# Patient Record
Sex: MALE | Race: WHITE | NOT HISPANIC OR LATINO | Employment: FULL TIME | URBAN - METROPOLITAN AREA
[De-identification: names, ages, dates, MRNs, and addresses within clinical notes are randomized per-mention and may not be internally consistent; named-entity substitution may affect disease eponyms.]

---

## 2019-08-14 ENCOUNTER — THERAPY VISIT (OUTPATIENT)
Dept: PHYSICAL THERAPY | Facility: CLINIC | Age: 54
End: 2019-08-14
Payer: COMMERCIAL

## 2019-08-14 DIAGNOSIS — S83.421D SPRAIN OF LATERAL COLLATERAL LIGAMENT OF RIGHT KNEE, SUBSEQUENT ENCOUNTER: ICD-10-CM

## 2019-08-14 DIAGNOSIS — M25.561 ACUTE PAIN OF RIGHT KNEE: ICD-10-CM

## 2019-08-14 PROBLEM — S83.421A SPRAIN OF LATERAL COLLATERAL LIGAMENT OF RIGHT KNEE: Status: ACTIVE | Noted: 2019-08-14

## 2019-08-14 PROCEDURE — 97010 HOT OR COLD PACKS THERAPY: CPT | Mod: GP | Performed by: PHYSICAL THERAPIST

## 2019-08-14 PROCEDURE — 97110 THERAPEUTIC EXERCISES: CPT | Mod: GP | Performed by: PHYSICAL THERAPIST

## 2019-08-14 PROCEDURE — 97161 PT EVAL LOW COMPLEX 20 MIN: CPT | Mod: GP | Performed by: PHYSICAL THERAPIST

## 2019-08-14 ASSESSMENT — ACTIVITIES OF DAILY LIVING (ADL)
SWELLING: THE SYMPTOM AFFECTS MY ACTIVITY SLIGHTLY
RAW_SCORE: 55
HOW_WOULD_YOU_RATE_THE_CURRENT_FUNCTION_OF_YOUR_KNEE_DURING_YOUR_USUAL_DAILY_ACTIVITIES_ON_A_SCALE_FROM_0_TO_100_WITH_100_BEING_YOUR_LEVEL_OF_KNEE_FUNCTION_PRIOR_TO_YOUR_INJURY_AND_0_BEING_THE_INABILITY_TO_PERFORM_ANY_OF_YOUR_USUAL_DAILY_ACTIVITIES?: 70
KNEE_ACTIVITY_OF_DAILY_LIVING_SCORE: 78.57
GIVING WAY, BUCKLING OR SHIFTING OF KNEE: I HAVE THE SYMPTOM BUT IT DOES NOT AFFECT MY ACTIVITY
STIFFNESS: THE SYMPTOM AFFECTS MY ACTIVITY MODERATELY
GO DOWN STAIRS: ACTIVITY IS NOT DIFFICULT
KNEEL ON THE FRONT OF YOUR KNEE: ACTIVITY IS FAIRLY DIFFICULT
AS_A_RESULT_OF_YOUR_KNEE_INJURY,_HOW_WOULD_YOU_RATE_YOUR_CURRENT_LEVEL_OF_DAILY_ACTIVITY?: ABNORMAL
GO UP STAIRS: ACTIVITY IS NOT DIFFICULT
SIT WITH YOUR KNEE BENT: ACTIVITY IS NOT DIFFICULT
SQUAT: ACTIVITY IS SOMEWHAT DIFFICULT
LIMPING: I DO NOT HAVE THE SYMPTOM
KNEE_ACTIVITY_OF_DAILY_LIVING_SUM: 55
RISE FROM A CHAIR: ACTIVITY IS MINIMALLY DIFFICULT
STAND: ACTIVITY IS NOT DIFFICULT
HOW_WOULD_YOU_RATE_THE_OVERALL_FUNCTION_OF_YOUR_KNEE_DURING_YOUR_USUAL_DAILY_ACTIVITIES?: ABNORMAL
WEAKNESS: THE SYMPTOM AFFECTS MY ACTIVITY SLIGHTLY
PAIN: I HAVE THE SYMPTOM BUT IT DOES NOT AFFECT MY ACTIVITY
WALK: ACTIVITY IS NOT DIFFICULT

## 2019-08-14 NOTE — PROGRESS NOTES
[facility] Initial Evaluation  Subjective:  Jorge Welch is a 54 year old male with a right knee condition.    The condition occurred due to a fall.  The condition occurred at home.  This is a new condition.    Mechanism/History of injury/symptoms:  5/25/19 patient fell while working on his deck and caught his right foot on a floor joist, tearing his LCL. He developed cellulitis in his lower right leg and was hospitalized for 6 days. This has resolved.  The pain is located anterior, lateral, medial and the quality of pain is reported as achy, shooting.  The degree of pain is reported as varying from 0-6/10. The timing of pain/symptoms is intermittent, worse at night. Associated symptoms include: stiffness, weakness, swelling    Symptoms are exacerbated by: squatting, running, kneeling, twisting.  Symptoms are relieved by: rest.  Since onset symptoms are gradually improving.    Special tests for this condition include: x-ray, MRI.  Previous treatments for this condition include: none.    General health as reported by patient is good.  Pertinent medical history includes: overweight, pain at rest/night.  Medical allergies include: none.  Other surgeries include: L ACL-R 14 years ago.  Current medications:  Anti-depressants    Current occupation: .  Patient's home/work tasks include: computer work, prolonged sitting, repetitive tasks.  Patient is currently working in normal job without restrictions.    Potential barriers to physical therapy: none.  Red flags: none.    Previous level of function: able to fully squat and kneel on right knee, no pain sleeping at night  Current functional restrictions:  Pain with partial squat or kneeling on right knee, shooting pains in knee up thigh when sleeping at night.    HPI                    Objective:  KNEE:    PROM:   L  R   Hyperextension 0 5   Extension 0 0   Flexion 130 125     Strength:   L R   HIP     Flex 5/5 5-/5   Ext 5/5 5/5    Abd 5-/5 4+/5   KNEE     Flex 5/5 4+/5   Ext 5/5 5-/5       Special tests:  Special tests deferred as patient has had MRI    Palpation: mild tenderness along right knee medial joint line    Patellar tracking: grossly WNL    Functional: pain with double leg squat to 70-75 degrees, limited to 15-20 degrees single leg squat on right vs 55-60 on left    Gait: wnl        System    Physical Exam    General     ROS    Assessment/Plan:    Patient is a 54 year old male with right side knee complaints.    Patient has the following significant findings with corresponding treatment plan.                Diagnosis 1:  Right knee pain, LCL tear    Pain -  hot/cold therapy, US, splint/taping/bracing/orthotics, self management, education and home program  Decreased ROM/flexibility - manual therapy, therapeutic exercise and home program  Decreased strength - therapeutic exercise, therapeutic activities and home program  Decreased proprioception - neuro re-education, therapeutic activities and home program  Decreased function - therapeutic activities, home program and functional performance testing  Instability -  Therapeutic Activity  Therapeutic Exercise  Neuromuscular Re-education  Splinting/Taping/Bracing/Orthotic  home program    Therapy Evaluation Codes:   1) History comprised of:   Personal factors that impact the plan of care:      None.    Comorbidity factors that impact the plan of care are:      Overweight and Pain at night/rest.     Medications impacting care: Anti-depressant.  2) Examination of Body Systems comprised of:   Body structures and functions that impact the plan of care:      Knee.   Activity limitations that impact the plan of care are:      Jumping, Running, Sports, Squatting/kneeling and Sleeping.  3) Clinical presentation characteristics are:   Stable/Uncomplicated.  4) Decision-Making    Low complexity using standardized patient assessment instrument and/or measureable assessment of functional  outcome.  Cumulative Therapy Evaluation is: Low complexity.    Previous and current functional limitations:  (See Goal Flow Sheet for this information)    Short term and Long term goals: (See Goal Flow Sheet for this information)     Communication ability:  Patient appears to be able to clearly communicate and understand verbal and written communication and follow directions correctly.  Treatment Explanation - The following has been discussed with the patient:   RX ordered/plan of care  Anticipated outcomes  Possible risks and side effects  This patient would benefit from PT intervention to resume normal activities.   Rehab potential is good.    Frequency:  1 X week, once daily  Duration:  for 4 weeks  Discharge Plan:  Achieve all LTG.  Independent in home treatment program.  Reach maximal therapeutic benefit.    Please refer to the daily flowsheet for treatment today, total treatment time and time spent performing 1:1 timed codes.

## 2019-08-14 NOTE — LETTER
[facility]  [address]  [place] [address]  [phone]    2019    Re: Jorge Welch   :   1965  MRN:  [id number]   REFERRING PHYSICIAN:   [facility]    Date of Initial Evaluation:  19  Visits:  Rxs Used: 1  Reason for Referral:     Acute pain of right knee  Sprain of lateral collateral ligament of right knee, subsequent encounter    [facility] Initial Evaluation    Subjective:  Jorge Welch is a 54 year old male with a right knee condition.    The condition occurred due to a fall.  The condition occurred at home.  This is a new condition.  Mechanism/History of injury/symptoms:  19 patient fell while working on his deck and caught his right foot on a floor joist, tearing his LCL. He developed cellulitis in his lower right leg and was hospitalized for 6 days. This has resolved.  The pain is located anterior, lateral, medial and the quality of pain is reported as achy, shooting.  The degree of pain is reported as varying from 0-6/10. The timing of pain/symptoms is intermittent, worse at night. Associated symptoms include: stiffness, weakness, swelling  Symptoms are exacerbated by: squatting, running, kneeling, twisting.  Symptoms are relieved by: rest.  Since onset symptoms are gradually improving.  Special tests for this condition include: x-ray, MRI.  Previous treatments for this condition include: none.  General health as reported by patient is good.  Pertinent medical history includes: overweight, pain at rest/night.  Medical allergies include: none.  Other surgeries include: L ACL-R 14 years ago.  Current medications:  Anti-depressants  Current occupation: .  Patient's home/work tasks include: computer work, prolonged sitting, repetitive tasks.  Patient is currently working in normal job without restrictions.  Potential barriers to physical therapy: none.  Red flags:  none.  Previous level of function: able to fully squat and kneel on right knee, no pain sleeping at night  Current functional restrictions:  Pain with partial squat or kneeling on right knee, shooting pains in knee up thigh when sleeping at night.    Objective:  KNEE:    PROM:   L  R   Hyperextension 0 5   Extension 0 0   Flexion 130 125     Strength:   L R   HIP     Flex 5/5 5-/5   Ext 5/5 5/5   Abd 5-/5 4+/5   KNEE     Flex 5/5 4+/5   Ext 5/5 5-/5     Special tests:  Special tests deferred as patient has had MRI    Palpation: mild tenderness along right knee medial joint line    Patellar tracking: grossly WNL    Functional: pain with double leg squat to 70-75 degrees, limited to 15-20 degrees single leg squat on right vs 55-60 on left    Gait: wnl    Assessment/Plan:    Patient is a 54 year old male with right side knee complaints.    Patient has the following significant findings with corresponding treatment plan.                Diagnosis 1:  Right knee pain, LCL tear    Pain -  hot/cold therapy, US, splint/taping/bracing/orthotics, self management, education and home program  Decreased ROM/flexibility - manual therapy, therapeutic exercise and home program  Decreased strength - therapeutic exercise, therapeutic activities and home program  Decreased proprioception - neuro re-education, therapeutic activities and home program  Decreased function - therapeutic activities, home program and functional performance testing  Instability -  Therapeutic Activity  Therapeutic Exercise  Neuromuscular Re-education  Splinting/Taping/Bracing/Orthotic  home program    Therapy Evaluation Codes:   1) History comprised of:   Personal factors that impact the plan of care:      None.    Comorbidity factors that impact the plan of care are:      Overweight and Pain at night/rest.     Medications impacting care: Anti-depressant.  2) Examination of Body Systems comprised of:   Body structures and functions that impact the plan of  care:      Knee.   Activity limitations that impact the plan of care are:      Jumping, Running, Sports, Squatting/kneeling and Sleeping.  3) Clinical presentation characteristics are:   Stable/Uncomplicated.  4) Decision-Making    Low complexity using standardized patient assessment instrument and/or measureable assessment of functional outcome.    Cumulative Therapy Evaluation is: Low complexity.  Previous and current functional limitations:  (See Goal Flow Sheet for this information)    Short term and Long term goals: (See Goal Flow Sheet for this information)   Communication ability:  Patient appears to be able to clearly communicate and understand verbal and written communication and follow directions correctly.  Treatment Explanation - The following has been discussed with the patient:   RX ordered/plan of care  Anticipated outcomes  Possible risks and side effects  This patient would benefit from PT intervention to resume normal activities.   Rehab potential is good.    Frequency:  1 X week, once daily  Duration:  for 4 weeks  Discharge Plan:  Achieve all LTG.  Independent in home treatment program.  Reach maximal therapeutic benefit.    Thank you for your referral.    INQUIRIES  Therapist: Magdiel Jackson, PT, DPT, SCS  [facility]  [address] [place] [address]  Phone: [phone]  Fax: [phone]

## 2019-08-23 ENCOUNTER — THERAPY VISIT (OUTPATIENT)
Dept: PHYSICAL THERAPY | Facility: CLINIC | Age: 54
End: 2019-08-23
Payer: COMMERCIAL

## 2019-08-23 DIAGNOSIS — S83.421D SPRAIN OF LATERAL COLLATERAL LIGAMENT OF RIGHT KNEE, SUBSEQUENT ENCOUNTER: ICD-10-CM

## 2019-08-23 DIAGNOSIS — M25.561 ACUTE PAIN OF RIGHT KNEE: ICD-10-CM

## 2019-08-23 PROCEDURE — 97112 NEUROMUSCULAR REEDUCATION: CPT | Mod: GP | Performed by: PHYSICAL THERAPIST

## 2019-08-23 PROCEDURE — 97010 HOT OR COLD PACKS THERAPY: CPT | Mod: GP | Performed by: PHYSICAL THERAPIST

## 2019-08-23 PROCEDURE — 97110 THERAPEUTIC EXERCISES: CPT | Mod: GP | Performed by: PHYSICAL THERAPIST

## 2019-08-29 ENCOUNTER — THERAPY VISIT (OUTPATIENT)
Dept: PHYSICAL THERAPY | Facility: CLINIC | Age: 54
End: 2019-08-29
Payer: COMMERCIAL

## 2019-08-29 DIAGNOSIS — M25.561 ACUTE PAIN OF RIGHT KNEE: ICD-10-CM

## 2019-08-29 DIAGNOSIS — S83.421D SPRAIN OF LATERAL COLLATERAL LIGAMENT OF RIGHT KNEE, SUBSEQUENT ENCOUNTER: ICD-10-CM

## 2019-08-29 PROCEDURE — 97112 NEUROMUSCULAR REEDUCATION: CPT | Mod: GP | Performed by: PHYSICAL THERAPIST

## 2019-08-29 PROCEDURE — 97110 THERAPEUTIC EXERCISES: CPT | Mod: GP | Performed by: PHYSICAL THERAPIST

## 2019-08-29 PROCEDURE — 97010 HOT OR COLD PACKS THERAPY: CPT | Mod: GP | Performed by: PHYSICAL THERAPIST

## 2019-08-29 ASSESSMENT — ACTIVITIES OF DAILY LIVING (ADL)
WALK: ACTIVITY IS NOT DIFFICULT
PAIN: I HAVE THE SYMPTOM BUT IT DOES NOT AFFECT MY ACTIVITY
RISE FROM A CHAIR: ACTIVITY IS NOT DIFFICULT
KNEE_ACTIVITY_OF_DAILY_LIVING_SUM: 66
SIT WITH YOUR KNEE BENT: ACTIVITY IS NOT DIFFICULT
HOW_WOULD_YOU_RATE_THE_CURRENT_FUNCTION_OF_YOUR_KNEE_DURING_YOUR_USUAL_DAILY_ACTIVITIES_ON_A_SCALE_FROM_0_TO_100_WITH_100_BEING_YOUR_LEVEL_OF_KNEE_FUNCTION_PRIOR_TO_YOUR_INJURY_AND_0_BEING_THE_INABILITY_TO_PERFORM_ANY_OF_YOUR_USUAL_DAILY_ACTIVITIES?: 98
AS_A_RESULT_OF_YOUR_KNEE_INJURY,_HOW_WOULD_YOU_RATE_YOUR_CURRENT_LEVEL_OF_DAILY_ACTIVITY?: NORMAL
SQUAT: ACTIVITY IS MINIMALLY DIFFICULT
RAW_SCORE: 66
SWELLING: I DO NOT HAVE THE SYMPTOM
HOW_WOULD_YOU_RATE_THE_OVERALL_FUNCTION_OF_YOUR_KNEE_DURING_YOUR_USUAL_DAILY_ACTIVITIES?: NORMAL
WEAKNESS: I DO NOT HAVE THE SYMPTOM
GO UP STAIRS: ACTIVITY IS NOT DIFFICULT
KNEE_ACTIVITY_OF_DAILY_LIVING_SCORE: 94.29
GIVING WAY, BUCKLING OR SHIFTING OF KNEE: I DO NOT HAVE THE SYMPTOM
KNEEL ON THE FRONT OF YOUR KNEE: ACTIVITY IS MINIMALLY DIFFICULT
GO DOWN STAIRS: ACTIVITY IS NOT DIFFICULT
STIFFNESS: I HAVE THE SYMPTOM BUT IT DOES NOT AFFECT MY ACTIVITY
STAND: ACTIVITY IS NOT DIFFICULT
LIMPING: I DO NOT HAVE THE SYMPTOM

## 2019-12-05 PROBLEM — M25.561 ACUTE PAIN OF RIGHT KNEE: Status: RESOLVED | Noted: 2019-08-14 | Resolved: 2019-12-05

## 2019-12-05 PROBLEM — S83.421A SPRAIN OF LATERAL COLLATERAL LIGAMENT OF RIGHT KNEE: Status: RESOLVED | Noted: 2019-08-14 | Resolved: 2019-12-05

## 2019-12-05 NOTE — PROGRESS NOTES
Patient is discharged from PT.  Please refer to SOAP note dated 8/29/19 for last known functional status as well as final objective/subjective values.

## 2021-04-10 ENCOUNTER — HEALTH MAINTENANCE LETTER (OUTPATIENT)
Age: 56
End: 2021-04-10

## 2021-09-19 ENCOUNTER — HEALTH MAINTENANCE LETTER (OUTPATIENT)
Age: 56
End: 2021-09-19

## 2022-05-01 ENCOUNTER — HEALTH MAINTENANCE LETTER (OUTPATIENT)
Age: 57
End: 2022-05-01

## 2022-11-21 ENCOUNTER — HEALTH MAINTENANCE LETTER (OUTPATIENT)
Age: 57
End: 2022-11-21

## 2023-06-02 ENCOUNTER — HEALTH MAINTENANCE LETTER (OUTPATIENT)
Age: 58
End: 2023-06-02

## 2024-08-05 ENCOUNTER — APPOINTMENT (OUTPATIENT)
Dept: RADIOLOGY | Facility: CLINIC | Age: 59
End: 2024-08-05
Payer: COMMERCIAL

## 2024-08-05 ENCOUNTER — APPOINTMENT (OUTPATIENT)
Dept: ULTRASOUND IMAGING | Facility: CLINIC | Age: 59
End: 2024-08-05
Payer: COMMERCIAL

## 2024-08-05 ENCOUNTER — HOSPITAL ENCOUNTER (EMERGENCY)
Facility: CLINIC | Age: 59
Discharge: HOME | End: 2024-08-05
Attending: EMERGENCY MEDICINE | Admitting: EMERGENCY MEDICINE
Payer: COMMERCIAL

## 2024-08-05 VITALS
HEART RATE: 92 BPM | RESPIRATION RATE: 19 BRPM | SYSTOLIC BLOOD PRESSURE: 173 MMHG | WEIGHT: 248 LBS | TEMPERATURE: 98.8 F | HEIGHT: 65 IN | OXYGEN SATURATION: 97 % | BODY MASS INDEX: 41.32 KG/M2 | DIASTOLIC BLOOD PRESSURE: 83 MMHG

## 2024-08-05 DIAGNOSIS — U07.1 COVID-19: ICD-10-CM

## 2024-08-05 PROBLEM — E78.00 HYPERCHOLESTEROLEMIA: Status: ACTIVE | Noted: 2024-08-05

## 2024-08-05 PROBLEM — S83.421A TEAR OF LATERAL COLLATERAL LIGAMENT OF RIGHT KNEE: Status: ACTIVE | Noted: 2019-08-29

## 2024-08-05 PROBLEM — R79.89 LOW TESTOSTERONE: Status: ACTIVE | Noted: 2018-02-14

## 2024-08-05 PROBLEM — M10.9 GOUT: Status: ACTIVE | Noted: 2023-03-21

## 2024-08-05 PROBLEM — M17.11 PRIMARY OSTEOARTHRITIS OF RIGHT KNEE: Status: ACTIVE | Noted: 2019-08-29

## 2024-08-05 LAB
ALBUMIN SERPL BCG-MCNC: 3.8 G/DL (ref 3.5–5.2)
ALP SERPL-CCNC: 70 U/L (ref 40–150)
ALT SERPL W P-5'-P-CCNC: 26 U/L (ref 0–70)
ANION GAP SERPL CALCULATED.3IONS-SCNC: 10 MMOL/L (ref 7–15)
AST SERPL W P-5'-P-CCNC: ABNORMAL U/L
BASOPHILS # BLD AUTO: 0 10E3/UL (ref 0–0.2)
BASOPHILS NFR BLD AUTO: 0 %
BILIRUB SERPL-MCNC: 0.3 MG/DL
BUN SERPL-MCNC: 12.7 MG/DL (ref 8–23)
CALCIUM SERPL-MCNC: 8.6 MG/DL (ref 8.8–10.4)
CHLORIDE SERPL-SCNC: 99 MMOL/L (ref 98–107)
CREAT SERPL-MCNC: 0.84 MG/DL (ref 0.51–1.17)
CRP SERPL-MCNC: 119 MG/L
EGFRCR SERPLBLD CKD-EPI 2021: >90 ML/MIN/1.73M2
EOSINOPHIL # BLD AUTO: 0.1 10E3/UL (ref 0–0.7)
EOSINOPHIL NFR BLD AUTO: 1 %
ERYTHROCYTE [DISTWIDTH] IN BLOOD BY AUTOMATED COUNT: 12.5 % (ref 10–15)
FLUAV RNA SPEC QL NAA+PROBE: NEGATIVE
FLUBV RNA RESP QL NAA+PROBE: NEGATIVE
GLUCOSE SERPL-MCNC: 150 MG/DL (ref 70–99)
HCO3 SERPL-SCNC: 27 MMOL/L (ref 22–29)
HCT VFR BLD AUTO: 37.3 % (ref 35–53)
HGB BLD-MCNC: 12.9 G/DL (ref 11.7–17.7)
IMM GRANULOCYTES # BLD: 0.1 10E3/UL
IMM GRANULOCYTES NFR BLD: 1 %
LACTATE SERPL-SCNC: 1.2 MMOL/L (ref 0.7–2)
LYMPHOCYTES # BLD AUTO: 0.7 10E3/UL (ref 0.8–5.3)
LYMPHOCYTES NFR BLD AUTO: 7 %
MAGNESIUM SERPL-MCNC: 1.9 MG/DL (ref 1.7–2.3)
MCH RBC QN AUTO: 30.6 PG (ref 26.5–33)
MCHC RBC AUTO-ENTMCNC: 34.6 G/DL (ref 31.5–36.5)
MCV RBC AUTO: 88 FL (ref 78–100)
MONOCYTES # BLD AUTO: 0.6 10E3/UL (ref 0–1.3)
MONOCYTES NFR BLD AUTO: 6 %
NEUTROPHILS # BLD AUTO: 8.6 10E3/UL (ref 1.6–8.3)
NEUTROPHILS NFR BLD AUTO: 86 %
NRBC # BLD AUTO: 0 10E3/UL
NRBC BLD AUTO-RTO: 0 /100
PLATELET # BLD AUTO: 205 10E3/UL (ref 150–450)
POTASSIUM SERPL-SCNC: 4.2 MMOL/L (ref 3.4–5.3)
PROCALCITONIN SERPL IA-MCNC: 0.14 NG/ML
PROT SERPL-MCNC: 6.8 G/DL (ref 6.4–8.3)
RBC # BLD AUTO: 4.22 10E6/UL (ref 3.8–5.9)
RSV RNA SPEC NAA+PROBE: NEGATIVE
SARS-COV-2 RNA RESP QL NAA+PROBE: POSITIVE
SODIUM SERPL-SCNC: 136 MMOL/L (ref 135–145)
TSH SERPL DL<=0.005 MIU/L-ACNC: 1.53 UIU/ML (ref 0.3–4.2)
WBC # BLD AUTO: 10 10E3/UL (ref 4–11)

## 2024-08-05 PROCEDURE — 87040 BLOOD CULTURE FOR BACTERIA: CPT | Performed by: EMERGENCY MEDICINE

## 2024-08-05 PROCEDURE — 250N000013 HC RX MED GY IP 250 OP 250 PS 637

## 2024-08-05 PROCEDURE — 36415 COLL VENOUS BLD VENIPUNCTURE: CPT | Performed by: EMERGENCY MEDICINE

## 2024-08-05 PROCEDURE — 83605 ASSAY OF LACTIC ACID: CPT

## 2024-08-05 PROCEDURE — 96361 HYDRATE IV INFUSION ADD-ON: CPT

## 2024-08-05 PROCEDURE — 87637 SARSCOV2&INF A&B&RSV AMP PRB: CPT

## 2024-08-05 PROCEDURE — 36415 COLL VENOUS BLD VENIPUNCTURE: CPT

## 2024-08-05 PROCEDURE — 83735 ASSAY OF MAGNESIUM: CPT | Performed by: EMERGENCY MEDICINE

## 2024-08-05 PROCEDURE — 84145 PROCALCITONIN (PCT): CPT | Performed by: EMERGENCY MEDICINE

## 2024-08-05 PROCEDURE — 84443 ASSAY THYROID STIM HORMONE: CPT | Performed by: EMERGENCY MEDICINE

## 2024-08-05 PROCEDURE — 85025 COMPLETE CBC W/AUTO DIFF WBC: CPT | Performed by: EMERGENCY MEDICINE

## 2024-08-05 PROCEDURE — 71046 X-RAY EXAM CHEST 2 VIEWS: CPT

## 2024-08-05 PROCEDURE — 86140 C-REACTIVE PROTEIN: CPT | Performed by: EMERGENCY MEDICINE

## 2024-08-05 PROCEDURE — 96360 HYDRATION IV INFUSION INIT: CPT | Mod: 59

## 2024-08-05 PROCEDURE — 258N000003 HC RX IP 258 OP 636: Performed by: EMERGENCY MEDICINE

## 2024-08-05 PROCEDURE — 93971 EXTREMITY STUDY: CPT | Mod: RT

## 2024-08-05 PROCEDURE — 82247 BILIRUBIN TOTAL: CPT | Performed by: EMERGENCY MEDICINE

## 2024-08-05 PROCEDURE — 99284 EMERGENCY DEPT VISIT MOD MDM: CPT | Mod: 25

## 2024-08-05 PROCEDURE — 84155 ASSAY OF PROTEIN SERUM: CPT | Performed by: EMERGENCY MEDICINE

## 2024-08-05 RX ORDER — HYDROXYZINE HYDROCHLORIDE 25 MG/1
TABLET, FILM COATED ORAL
COMMUNITY
Start: 2024-07-19

## 2024-08-05 RX ORDER — HYDROMORPHONE HYDROCHLORIDE 1 MG/ML
0.5 INJECTION, SOLUTION INTRAMUSCULAR; INTRAVENOUS; SUBCUTANEOUS ONCE
Status: DISCONTINUED | OUTPATIENT
Start: 2024-08-05 | End: 2024-08-05 | Stop reason: HOSPADM

## 2024-08-05 RX ORDER — ROSUVASTATIN CALCIUM 10 MG/1
10 TABLET, COATED ORAL AT BEDTIME
COMMUNITY

## 2024-08-05 RX ORDER — ACETAMINOPHEN 325 MG/1
650 TABLET ORAL ONCE
Status: COMPLETED | OUTPATIENT
Start: 2024-08-05 | End: 2024-08-05

## 2024-08-05 RX ORDER — ALLOPURINOL 100 MG/1
1 TABLET ORAL
COMMUNITY
Start: 2023-12-17

## 2024-08-05 RX ORDER — SERTRALINE HYDROCHLORIDE 100 MG/1
1 TABLET, FILM COATED ORAL DAILY
COMMUNITY
Start: 2023-11-08

## 2024-08-05 RX ORDER — CELECOXIB 200 MG/1
200 CAPSULE ORAL
COMMUNITY
Start: 2024-06-15

## 2024-08-05 RX ADMIN — ACETAMINOPHEN 650 MG: 325 TABLET ORAL at 17:49

## 2024-08-05 RX ADMIN — SODIUM CHLORIDE 1000 ML: 9 INJECTION, SOLUTION INTRAVENOUS at 17:46

## 2024-08-05 ASSESSMENT — ACTIVITIES OF DAILY LIVING (ADL): ADLS_ACUITY_SCORE: 35

## 2024-08-05 NOTE — ED PROVIDER NOTES
EMERGENCY DEPARTMENT ENCOUNTER      NAME: Jorge Welch  AGE: 59 year old adult  YOB: 1965  MRN: [id number]  EVALUATION DATE & TIME: No admission date for patient encounter.    PCP: Dustin Alaniz        Chief Complaint   Patient presents with    Fever    Shortness of Breath         IMPRESSION  1. COVID-19        PLAN  - close PCP follow up  - discharge to home    ED COURSE & MEDICAL DECISION MAKING    ED Course as of 08/05/24 2006   Mon Aug 05, 2024   1810 CXR independently reviewed & interpreted by me: no acute cardiopulmonary process.       5:41 PM I was consulted by Emilia Mcgowan PA-C on this patient. I reviewed ED presentation history, assessment, management, plan to this point. Please see ED JANE note for details.    Briefly, 59yoM POD #5 s/p right hip replacement (recovering at home) with 2 days of cough & fever. Found to have COVID here today; suspect this driving presentation. No concern for surgical infection. CXR clear with no pneumonia and procal 0.14; doubt concomitant bacterial pneumonia warranting antibiotics. Blood tests unremarkable. Satting well on room air with normal vitals; ok for outpatient management. Declines Paxlovid which is reasonable to me. Patient able to tolerate PO and walk without difficulty or desat. Patient & family comfortable with discharge at this time. Return precautions and need for PCP follow up discussed and understood. No further questions at the time of discharge.    Did complain of mild right calf pain but no exam suggestion of DVT; US negative as well.        This patient involved a high degree of complexity in medical decision making, as significant risks were present and assessed. Recent encounters & results in medical record reviewed by me.    All workup (i.e. any EKG/labs/imaging as per charting below) reviewed and independently interpreted by me. See respective sections for details.    Broad differential considered for this patient, including  but not limited to:  pneumonia, viral UTI, surgical infection, DVT, PE, cellulitis, necrotizing fasciitis, sepsis, UTI    I had a face to face encounter with this patient seen by the Advanced Practice Provider (JANE). I personally made/approved the management plan and take responsibility for the patient management. I personally saw patient and performed a substantive portion of the visit including all aspects of the medical decision making.    MEDICATIONS GIVEN IN THE EMERGENCY DEPARTMENT  Medications   sodium chloride (PF) 0.9% PF flush 3 mL (has no administration in time range)   HYDROmorphone (PF) (DILAUDID) injection 0.5 mg (has no administration in time range)   acetaminophen (TYLENOL) tablet 650 mg (650 mg Oral $Given 8/5/24 1749)   sodium chloride 0.9% BOLUS 1,000 mL (0 mLs Intravenous Stopped 8/5/24 1933)       NEW PRESCRIPTIONS STARTED AT TODAY'S ER VISIT  Discharge Medication List as of 8/5/2024  7:35 PM        CONTINUE these medications which have NOT CHANGED    Details   allopurinol (ZYLOPRIM) 100 MG tablet Take 1 tablet by mouth daily at 2 pm, Historical      celecoxib (CELEBREX) 200 MG capsule 200 mg, Historical      hydrOXYzine HCl (ATARAX) 25 MG tablet TAKE 1 CAPSULE UP TO 4 TIMES PER DAY AS NEEDED FOR PAIN., Historical      sertraline (ZOLOFT) 100 MG tablet Take 1 tablet by mouth daily, Historical      rosuvastatin (CRESTOR) 10 MG tablet Take 10 mg by mouth at bedtime, Historical                 =================================================================      HPI  Use of : N/A         Jorge Welch is a 59 year old adult with a pertinent history of S/P left hip replacement who presents to this ED via walk-in for evaluation of fever and shortness of breath.     Patient reports fevers, chills, and general malaise worsening since last night. Family says he had a fever of 102 last night. Patient endorses some worsening left leg pain and some shortness of breath as well. He had a  left hip replacement 4 days ago, 8/1/24. Patient on Asprin but denies any other blood thinners. Family says he last had oxycodone and hydroxyzine last at 2 PM. Otherwise, patient denies any chest pain, congestion, runny nose, or headache separate from cough. No other complaints at this time.       --------------- MEDICAL HISTORY ---------------  PAST MEDICAL HISTORY:  Reviewed by me.  History reviewed. No pertinent past medical history.  Patient Active Problem List   Diagnosis    Anxiety    Gout    Hypercholesterolemia    Low testosterone    Major depression, chronic    Primary osteoarthritis of right knee    Tear of lateral collateral ligament of right knee       PAST SURGICAL HISTORY:  Reviewed by me.  History reviewed. No pertinent surgical history.    CURRENT MEDICATIONS:    Reviewed by me.    Current Facility-Administered Medications:     HYDROmorphone (PF) (DILAUDID) injection 0.5 mg, 0.5 mg, Intravenous, Once, Emilia Mcgowan PA-C    sodium chloride (PF) 0.9% PF flush 3 mL, 3 mL, Intracatheter, Q8H, Emilia Mcgowan PA-C    Current Outpatient Medications:     allopurinol (ZYLOPRIM) 100 MG tablet, Take 1 tablet by mouth daily at 2 pm, Disp: , Rfl:     celecoxib (CELEBREX) 200 MG capsule, 200 mg, Disp: , Rfl:     hydrOXYzine HCl (ATARAX) 25 MG tablet, TAKE 1 CAPSULE UP TO 4 TIMES PER DAY AS NEEDED FOR PAIN., Disp: , Rfl:     sertraline (ZOLOFT) 100 MG tablet, Take 1 tablet by mouth daily, Disp: , Rfl:     rosuvastatin (CRESTOR) 10 MG tablet, Take 10 mg by mouth at bedtime, Disp: , Rfl:     ALLERGIES:  Reviewed by me.  No Known Allergies    FAMILY HISTORY:  Reviewed by me.  History reviewed. No pertinent family history.    SOCIAL HISTORY:   Reviewed by me.  Social History     Socioeconomic History    Marital status:      Spouse name: None    Number of children: None    Years of education: None    Highest education level: None     Social Determinants of Health     Financial Resource Strain: Low  "Risk  (7/10/2024)    Received from [facility]    Financial Resource Strain     Difficulty of Paying Living Expenses: 3   Food Insecurity: No Food Insecurity (7/10/2024)    Received from [facility]    Food Insecurity     Worried About Running Out of Food in the Last Year: 1   Transportation Needs: No Transportation Needs (7/10/2024)    Received from [facility]    Transportation Needs     Lack of Transportation (Medical): 1   Social Connections: Socially Integrated (7/10/2024)    Received from [facility]    Social Connections     Frequency of Communication with Friends and Family: 0   Housing Stability: Low Risk  (7/10/2024)    Received from [facility]    Housing Stability     Unable to Pay for Housing in the Last Year: 1         --------------- PHYSICAL EXAM ---------------  Nursing notes and vitals independently reviewed by me.  VITALS:  Vitals:    08/05/24 1726 08/05/24 1728 08/05/24 1937   BP: (!) 141/67  (!) 173/83   Pulse: 100  92   Resp: 19     Temp:  (!) 101.4  F (38.6  C) 98.8  F (37.1  C)   TempSrc:  Oral Oral   SpO2: 97%  97%   Weight: 112.5 kg (248 lb)     Height: 1.651 m (5' 5\")         PHYSICAL EXAM:    General:  alert, interactive, no distress  Eyes:  conjunctivae clear, conjugate gaze  HENT:  atraumatic, nose with no rhinorrhea, oropharynx clear, moist mucous membranes  Neck:  no meningismus  Cardiovascular:  HR 90s during exam, regular rhythm, no murmurs, brisk cap refill  Chest:  no chest wall tenderness  Pulmonary:  no stridor, normal phonation, normal work of breathing, clear lungs bilaterally  Abdomen: soft, nondistended, nontender  :  no CVA tenderness  Back:  no midline spinal tenderness  Musculoskeletal:  no pretibial edema, no calf tenderness. Right lateral hip incision with clean dry surgical dressing in place  Skin:  warm, " dry, no rash  Neuro:  awake, alert, answers questions appropriately, follows commands, moves all limbs  Psych:  calm, normal affect      --------------- RESULTS ---------------  LAB:  Reviewed and independently interpreted by me.  Results for orders placed or performed during the hospital encounter of 08/05/24   Chest XR,  PA & LAT    Impression    IMPRESSION: Negative chest.   US Lower Extremity Venous Duplex Right    Impression    IMPRESSION:  1.  No deep venous thrombosis in the right lower extremity.   Comprehensive metabolic panel   Result Value Ref Range    Sodium 136 135 - 145 mmol/L    Potassium 4.2 3.4 - 5.3 mmol/L    Carbon Dioxide (CO2) 27 22 - 29 mmol/L    Anion Gap 10 7 - 15 mmol/L    Urea Nitrogen 12.7 8.0 - 23.0 mg/dL    Creatinine 0.84 0.51 - 1.17 mg/dL    GFR Estimate >90 >60 mL/min/1.73m2    Calcium 8.6 (L) 8.8 - 10.4 mg/dL    Chloride 99 98 - 107 mmol/L    Glucose 150 (H) 70 - 99 mg/dL    Alkaline Phosphatase 70 40 - 150 U/L    AST      ALT 26 0 - 70 U/L    Protein Total 6.8 6.4 - 8.3 g/dL    Albumin 3.8 3.5 - 5.2 g/dL    Bilirubin Total 0.3 <=1.2 mg/dL   CBC with platelets and differential   Result Value Ref Range    WBC Count 10.0 4.0 - 11.0 10e3/uL    RBC Count 4.22 3.80 - 5.90 10e6/uL    Hemoglobin 12.9 11.7 - 17.7 g/dL    Hematocrit 37.3 35.0 - 53.0 %    MCV 88 78 - 100 fL    MCH 30.6 26.5 - 33.0 pg    MCHC 34.6 31.5 - 36.5 g/dL    RDW 12.5 10.0 - 15.0 %    Platelet Count 205 150 - 450 10e3/uL    % Neutrophils 86 %    % Lymphocytes 7 %    % Monocytes 6 %    % Eosinophils 1 %    % Basophils 0 %    % Immature Granulocytes 1 %    NRBCs per 100 WBC 0 <1 /100    Absolute Neutrophils 8.6 (H) 1.6 - 8.3 10e3/uL    Absolute Lymphocytes 0.7 (L) 0.8 - 5.3 10e3/uL    Absolute Monocytes 0.6 0.0 - 1.3 10e3/uL    Absolute Eosinophils 0.1 0.0 - 0.7 10e3/uL    Absolute Basophils 0.0 0.0 - 0.2 10e3/uL    Absolute Immature Granulocytes 0.1 <=0.4 10e3/uL    Absolute NRBCs 0.0 10e3/uL   Symptomatic Influenza A/B,  RSV, & SARS-CoV2 PCR (COVID-19) Nasopharyngeal    Specimen: Nasopharyngeal; Swab   Result Value Ref Range    Influenza A PCR Negative Negative    Influenza B PCR Negative Negative    RSV PCR Negative Negative    SARS CoV2 PCR Positive (A) Negative   Lactic Acid Whole Blood with 1X Repeat in 2 HR when >2   Result Value Ref Range    Lactic Acid, Initial 1.2 0.7 - 2.0 mmol/L   Result Value Ref Range    CRP Inflammation 119.00 (H) <5.00 mg/L   Result Value Ref Range    Magnesium 1.9 1.7 - 2.3 mg/dL   TSH with free T4 reflex   Result Value Ref Range    TSH 1.53 0.30 - 4.20 uIU/mL   Result Value Ref Range    Procalcitonin 0.14 <0.50 ng/mL       RADIOLOGY:  Reviewed and independently interpreted by me. Please see official radiology report.  Recent Results (from the past 24 hour(s))   Chest XR,  PA & LAT    Narrative    EXAM: XR CHEST 2 VIEWS  LOCATION: [facility]  DATE: 8/5/2024    INDICATION: productive cough  COMPARISON: None.      Impression    IMPRESSION: Negative chest.   US Lower Extremity Venous Duplex Right    Narrative    EXAM: US LOWER EXTREMITY VENOUS DUPLEX RIGHT  LOCATION: [facility]  DATE: 8/5/2024    INDICATION: right leg pain. Hx of recent right hip surgery  COMPARISON: None.  TECHNIQUE: Venous Duplex ultrasound of the right lower extremity with and without compression, augmentation and duplex. Color flow and spectral Doppler with waveform analysis performed.    FINDINGS: Exam includes the common femoral, femoral, popliteal, and contralateral common femoral veins as well as segmentally visualized deep calf veins and greater saphenous vein.     RIGHT: No deep vein thrombosis. No superficial thrombophlebitis. No popliteal cyst.      Impression    IMPRESSION:  1.  No deep venous thrombosis in the right lower extremity.                 --------------- ADDITIONAL MDM ---------------  History:  - I considered systemic symptoms of the presenting illness.  -  Supplemental history from:       -- patient, family  - External Record(s) reviewed:       -- Inpatient/outpatient record (clinic visit 1/4/24), prior labs (blood 7/10/24), prior imaging (X-rays hip injection 3/8/24)       -- see above ED course & MDM for further details    Workup:  - Chart documentation above includes differential considered and any EKGs or imaging independently interpreted by provider.  - emergent/severe conditions considered and evaluated for: see above differential & MDM  - In additional to work up documented, I considered the following work up:       -- CTA chest/abdomen/pelvis, urine       -- see above ED course & MDM for further details    External Consultation:  - Discussion of management with another provider:       -- ED pharmacist re: meds       -- see above charting for additional    Complicating Factors:  - Care impacted by chronic illness:       -- see above MDM, past medical history, & problem list  - Care affected by social determinants of health:       -- see above social history       -- Access to Affordable Healthcare    Disposition Considerations:  - Discharge       -- I considered escalation of care with admission to the hospital, but ultimately discharged the patient per decision making above       -- I recommended the patient continue their current prescription strength medication(s) as charted above in current medications list       -- I considered prescription pain medication, comfortable without this       -- I recommended over-the-counter medication(s) as charted above & in discharge instructions           I, Miroslava Chiang, am serving as a scribe to document services personally performed by Dr. Evin Harding based on my observation and the provider's statements to me. I, Evin Harding MD attest that Miroslava Chiang is acting in a scribe capacity, has observed my performance of the services and has documented them in accordance with my direction.      Evin Harding  MD  08/05/24  Emergency Medicine  [facility] EMERGENCY ROOM  [facility] [facility] [id number] [phone]  Dept: [phone]      Evin Harding MD  08/05/24 2223

## 2024-08-05 NOTE — ED TRIAGE NOTES
Pt arrives with fever, sob, and generally not feeling well starting last evening and into today. Had a RTHA last Thursday. Also having increased pain to that hip. He is febrile in triage. Sepsis workup started.      Triage Assessment (Adult)       Row Name 08/05/24 5349          Triage Assessment    Airway WDL WDL        Respiratory WDL    Respiratory WDL rhythm/pattern     Rhythm/Pattern, Respiratory shortness of breath        Skin Circulation/Temperature WDL    Skin Circulation/Temperature WDL WDL        Cardiac WDL    Cardiac WDL WDL        Peripheral/Neurovascular WDL    Peripheral Neurovascular WDL WDL        Cognitive/Neuro/Behavioral WDL    Cognitive/Neuro/Behavioral WDL WDL

## 2024-08-05 NOTE — ED PROVIDER NOTES
EMERGENCY DEPARTMENT ENCOUNTER      NAME: Jorge Welch  AGE: 59 year old adult  YOB: 1965  MRN: [id number]  EVALUATION DATE & TIME: No admission date for patient encounter.    PCP: Dustin Alaniz    ED PROVIDER: Emilia Mcgowan PA-C      Chief Complaint   Patient presents with    Fever    Shortness of Breath     FINAL IMPRESSION:  1. COVID-19      ED COURSE & MEDICAL DECISION MAKING:    Pertinent Labs & Imaging studies reviewed. (See chart for details)  59 year old adult presents to the Emergency Department for evaluation of fever.  The patient had a right hip replacement.  Patient reports that he was doing well until yesterday evening when he started to develop fever and chills.  Patient also reports a productive cough.  Patient also complains of right lower extremity pain in his anterior shin.  Patient's been controlling his pain with oxycodone.  Last dose was at 2 PM.  Denies any chest pain or shortness of breath.  Vital signs reviewed and patient is hypertensive and febrile.  On exam Aquacel bandage over left anterior hip incision.  5 out of 5 strength in bilateral lower extremities.  Normal range of motion, sensation and strength of bilateral lower extremities.  Pulses are 2+ bilaterally.  Right lower extremity is mildly tender to palpation in the hip and calf.  Remainder of the extremities are nontender to palpation.  Abdomen is soft and nontender.  Cardiac and pulm exams unremarkable.  No overlying erythema, edema, ecchymosis.  No lacerations or abrasions.  Normal warmth.    Differential diagnosis includes cellulitis, DVT, infection, pneumonia, COVID, influenza, RSV.  CBC shows no white blood cell count elevation.  Hemoglobin is 12.9.  CMP shows low sodium and normal potassium elevation.  Anion gap of 10.  Creatinine is 0.84.  Calcium is 8.6.  CRP is elevated at 119.  Proclcitonin is within normal limits.  Magnesium is 1.9.  TSH is 1.53. Lactate was 1.2. Blood cultures are pending.   RSV is negative.  COVID is positive.  Chest x-ray is negative.  Ultrasound of the lower extremity shows no DVT.  Patient received Tylenol, fluids and pain medications while in the ED.  Patient was educated on his results. Patient agrees with plan.  All questions answered.  Patient will  be discharged home.  Patient will follow-up with his primary care doctor discuss his ED visit.  Patient return to the ED if new symptoms develop or symptoms worsen.    ED COURSE:   5:20 PM I saw the patient. Staffed with Dr. Harding.   6:38 PM patient.  Patient is educated on his results.  Patient agrees with plan.  All questions answered.  7:06 PM patient was educated on results.  Patient be discharged home.  ED Course as of 08/05/24 1935   Mon Aug 05, 2024   1810 CXR independently reviewed & interpreted by me: no acute cardiopulmonary process.       At the conclusion of the encounter I discussed the results of all of the tests and the disposition. The questions were answered. The patient or family acknowledged understanding and was agreeable with the care plan.     0 minutes of critical care time       Medical Decision Making  Obtained supplemental history:Supplemental history obtained?: Family Member/Significant Other  Reviewed external records: External records reviewed?: No  Care impacted by chronic illness:Mental Health  Care significantly affected by social determinants of health:N/A  Did you consider but not order tests?: Work up considered but not performed and documented in chart, if applicable  Did you interpret images independently?: Independent interpretation of ECG and images noted in documentation, when applicable.  Consultation discussion with other provider:Did you involve another provider (consultant, MH, pharmacy, etc.)?: I discussed the care with another health care provider, see documentation for details.  Discharge. No recommendations on prescription strength medication(s). See documentation for any additional  details.      MEDICATIONS GIVEN IN THE EMERGENCY:  Medications   sodium chloride (PF) 0.9% PF flush 3 mL (has no administration in time range)   HYDROmorphone (PF) (DILAUDID) injection 0.5 mg (has no administration in time range)   acetaminophen (TYLENOL) tablet 650 mg (650 mg Oral $Given 8/5/24 0458)   sodium chloride 0.9% BOLUS 1,000 mL (0 mLs Intravenous Stopped 8/5/24 1933)       NEW PRESCRIPTIONS STARTED AT TODAY'S ER VISIT  New Prescriptions    No medications on file          =================================================================    [facility]    Patient information was obtained from: Patient and patient's family    Use of : N/A         Jorge Welch is a 59 year old adult with a pertinent history of S/P left hip replacement who presents to this ED via walk-in for evaluation of fever and shortness of breath.    Patient reports fevers, chills, and general malaise worsening since last night. Family says he had a fever of 102 last night. Patient endorses some worsening left leg pain and some productive cough as well. He had a left hip replacement 4 days ago, 8/1/24. Patient on Asprin but denies any other blood thinners. Family says he last had oxycodone and hydroxyzine last at 2 PM. Otherwise, patient denies any chest pain, sob, chest pain, congestion, runny nose, or headache separate from cough. No other complaints at this time.     REVIEW OF SYSTEMS   As per HPI    PAST MEDICAL HISTORY:  History reviewed. No pertinent past medical history.    PAST SURGICAL HISTORY:  History reviewed. No pertinent surgical history.        CURRENT MEDICATIONS:    allopurinol (ZYLOPRIM) 100 MG tablet  celecoxib (CELEBREX) 200 MG capsule  hydrOXYzine HCl (ATARAX) 25 MG tablet  sertraline (ZOLOFT) 100 MG tablet  rosuvastatin (CRESTOR) 10 MG tablet        ALLERGIES:  No Known Allergies    FAMILY HISTORY:  History reviewed. No pertinent family history.    SOCIAL HISTORY:   Social History     Socioeconomic History  "   Marital status:      Social Determinants of Health     Financial Resource Strain: Low Risk  (7/10/2024)    Received from [facility] [employer] [facility]    Financial Resource Strain     Difficulty of Paying Living Expenses: 3   Food Insecurity: No Food Insecurity (7/10/2024)    Received from [facility] [employer] [facility]    Food Insecurity     Worried About Running Out of Food in the Last Year: 1   Transportation Needs: No Transportation Needs (7/10/2024)    Received from [facility] [employer] [facility]    Transportation Needs     Lack of Transportation (Medical): 1   Social Connections: Socially Integrated (7/10/2024)    Received from [facility] [employer] [facility]    Social Connections     Frequency of Communication with Friends and Family: 0   Housing Stability: Low Risk  (7/10/2024)    Received from [facility] [employer] [facility] [employer] [facility]    Housing Stability     Unable to Pay for Housing in the Last Year: 1       VITALS:  BP (!) 141/67   Pulse 100   Temp (!) 101.4  F (38.6  C) (Oral)   Resp 19   Ht 1.651 m (5' 5\")   Wt 112.5 kg (248 lb)   SpO2 97%   BMI 41.27 kg/m      PHYSICAL EXAM    Physical Exam  Vitals and nursing note reviewed.   Constitutional:       General: Jorge is not in acute distress.     Appearance: Normal appearance. Jorge is not ill-appearing, toxic-appearing or diaphoretic.   HENT:      Head: Atraumatic.      Right Ear: External ear normal.      Left Ear: External ear normal.      Nose: Nose normal.      Mouth/Throat:      Mouth: Mucous membranes are moist.   Eyes:      Conjunctiva/sclera: Conjunctivae normal.      Pupils: Pupils are equal, round, and reactive to light.   Cardiovascular:      Rate and Rhythm: Normal rate and regular rhythm.      Pulses: Normal pulses.      Heart sounds: Normal heart sounds. No murmur heard.     No friction rub. No gallop.   Pulmonary:      Effort: Pulmonary effort is normal.      " Breath sounds: Normal breath sounds. No wheezing or rales.   Abdominal:      Tenderness: There is no abdominal tenderness. There is no guarding or rebound.   Musculoskeletal:      Cervical back: Normal range of motion.      Comments: Aquacel over anterior right hip.  No obvious surrounding erythema, edema, ecchymosis.  No lacerations or abrasions.  Normal warmth.  Mild tenderness to the anterior right hip as well as calf.  Remainder of the right leg is nontender to palpation.  Remainder of extremities are nontender to palpation.  Normal range of motion, sensation and strength of all 4 extremities.  Pulses are 2+ bilaterally.  Normal capillary refill.   Skin:     General: Skin is dry.   Neurological:      Mental Status: Jorge is alert. Mental status is at baseline.   Psychiatric:         Mood and Affect: Mood normal.         Thought Content: Thought content normal.          LAB:  All pertinent labs reviewed and interpreted.  Labs Ordered and Resulted from Time of ED Arrival to Time of ED Departure   COMPREHENSIVE METABOLIC PANEL - Abnormal       Result Value    Sodium 136      Potassium 4.2      Carbon Dioxide (CO2) 27      Anion Gap 10      Urea Nitrogen 12.7      Creatinine 0.84      GFR Estimate >90      Calcium 8.6 (*)     Chloride 99      Glucose 150 (*)     Alkaline Phosphatase 70      AST        ALT 26      Protein Total 6.8      Albumin 3.8      Bilirubin Total 0.3     CBC WITH PLATELETS AND DIFFERENTIAL - Abnormal    WBC Count 10.0      RBC Count 4.22      Hemoglobin 12.9      Hematocrit 37.3      MCV 88      MCH 30.6      MCHC 34.6      RDW 12.5      Platelet Count 205      % Neutrophils 86      % Lymphocytes 7      % Monocytes 6      % Eosinophils 1      % Basophils 0      % Immature Granulocytes 1      NRBCs per 100 WBC 0      Absolute Neutrophils 8.6 (*)     Absolute Lymphocytes 0.7 (*)     Absolute Monocytes 0.6      Absolute Eosinophils 0.1      Absolute Basophils 0.0      Absolute Immature  Granulocytes 0.1      Absolute NRBCs 0.0     INFLUENZA A/B, RSV, & SARS-COV2 PCR - Abnormal    Influenza A PCR Negative      Influenza B PCR Negative      RSV PCR Negative      SARS CoV2 PCR Positive (*)    CRP INFLAMMATION - Abnormal    CRP Inflammation 119.00 (*)    LACTIC ACID WHOLE BLOOD WITH 1X REPEAT IN 2 HR WHEN >2 - Normal    Lactic Acid, Initial 1.2     MAGNESIUM - Normal    Magnesium 1.9     TSH WITH FREE T4 REFLEX - Normal    TSH 1.53     PROCALCITONIN - Normal    Procalcitonin 0.14     BLOOD CULTURE        RADIOLOGY:  Reviewed all pertinent imaging. Please see official radiology report.  US Lower Extremity Venous Duplex Right   Final Result   IMPRESSION:   1.  No deep venous thrombosis in the right lower extremity.      Chest XR,  PA & LAT   Final Result   IMPRESSION: Negative chest.        I, Miroslava Chiang, am serving as a scribe to document services personally performed by Emilia Mcgowan PA-C, based on my observation and the provider's statements to me. I, Emilia Mcgowan PA-C, attest that Miroslava Chiang is acting in a scribe capacity, has observed my performance of the services and has documented them in accordance with my direction.    Emilia Mcgowan PA-C  [facility] EMERGENCY ROOM  [address] [address] [phone]     Emilia Mcgowan PA-C  08/09/24 2735

## 2024-08-06 NOTE — DISCHARGE INSTRUCTIONS
Rest.  Orally hydrate.  You have COVID.  Return to the ED if new symptoms develop or symptoms worsen.  Follow-up with your primary care doctor discuss your ED visit.  Continue to follow-up with [facility] to discuss your ED visit.

## 2024-08-10 LAB — BACTERIA BLD CULT: NO GROWTH

## 2025-01-04 ENCOUNTER — HEALTH MAINTENANCE LETTER (OUTPATIENT)
Age: 60
End: 2025-01-04

## 2025-04-28 ENCOUNTER — APPOINTMENT (OUTPATIENT)
Dept: URBAN - METROPOLITAN AREA CLINIC 252 | Age: 60
Setting detail: DERMATOLOGY
End: 2025-04-28

## 2025-04-28 VITALS — HEIGHT: 65 IN | WEIGHT: 250 LBS

## 2025-04-28 DIAGNOSIS — L82.1 OTHER SEBORRHEIC KERATOSIS: ICD-10-CM

## 2025-04-28 DIAGNOSIS — L73.8 OTHER SPECIFIED FOLLICULAR DISORDERS: ICD-10-CM

## 2025-04-28 DIAGNOSIS — Z71.89 OTHER SPECIFIED COUNSELING: ICD-10-CM

## 2025-04-28 DIAGNOSIS — D18.0 HEMANGIOMA: ICD-10-CM

## 2025-04-28 DIAGNOSIS — D22 MELANOCYTIC NEVI: ICD-10-CM

## 2025-04-28 DIAGNOSIS — L28.1 PRURIGO NODULARIS: ICD-10-CM

## 2025-04-28 DIAGNOSIS — L91.8 OTHER HYPERTROPHIC DISORDERS OF THE SKIN: ICD-10-CM

## 2025-04-28 PROBLEM — D22.4 MELANOCYTIC NEVI OF SCALP AND NECK: Status: ACTIVE | Noted: 2025-04-28

## 2025-04-28 PROBLEM — D18.01 HEMANGIOMA OF SKIN AND SUBCUTANEOUS TISSUE: Status: ACTIVE | Noted: 2025-04-28

## 2025-04-28 PROBLEM — D22.5 MELANOCYTIC NEVI OF TRUNK: Status: ACTIVE | Noted: 2025-04-28

## 2025-04-28 PROBLEM — D23.72 OTHER BENIGN NEOPLASM OF SKIN OF LEFT LOWER LIMB, INCLUDING HIP: Status: ACTIVE | Noted: 2025-04-28

## 2025-04-28 PROCEDURE — OTHER PRESCRIPTION: OTHER

## 2025-04-28 PROCEDURE — OTHER MIPS QUALITY: OTHER

## 2025-04-28 PROCEDURE — OTHER COUNSELING: OTHER

## 2025-04-28 PROCEDURE — OTHER SKIN TAG REMOVAL: OTHER

## 2025-04-28 RX ORDER — TRIAMCINOLONE ACETONIDE 5 MG/G
0.5% CREAM TOPICAL
Qty: 15 | Refills: 2 | Status: ERX | COMMUNITY
Start: 2025-04-28

## 2025-04-28 ASSESSMENT — LOCATION SIMPLE DESCRIPTION DERM
LOCATION SIMPLE: LEFT UPPER BACK
LOCATION SIMPLE: LEFT AXILLARY VAULT
LOCATION SIMPLE: CHEST
LOCATION SIMPLE: RIGHT AXILLARY VAULT
LOCATION SIMPLE: RIGHT CHEEK
LOCATION SIMPLE: RIGHT ANTERIOR NECK
LOCATION SIMPLE: UPPER BACK
LOCATION SIMPLE: RIGHT UPPER BACK
LOCATION SIMPLE: LEFT KNEE

## 2025-04-28 ASSESSMENT — LOCATION ZONE DERM
LOCATION ZONE: TRUNK
LOCATION ZONE: AXILLAE
LOCATION ZONE: LEG
LOCATION ZONE: FACE
LOCATION ZONE: NECK

## 2025-04-28 ASSESSMENT — LOCATION DETAILED DESCRIPTION DERM
LOCATION DETAILED: RIGHT INFERIOR LATERAL NECK
LOCATION DETAILED: RIGHT AXILLARY VAULT
LOCATION DETAILED: INFERIOR THORACIC SPINE
LOCATION DETAILED: RIGHT SUPERIOR UPPER BACK
LOCATION DETAILED: RIGHT CENTRAL MALAR CHEEK
LOCATION DETAILED: LEFT KNEE
LOCATION DETAILED: LEFT SUPERIOR LATERAL UPPER BACK
LOCATION DETAILED: LOWER STERNUM
LOCATION DETAILED: LEFT AXILLARY VAULT

## 2025-04-28 NOTE — PROCEDURE: SKIN TAG REMOVAL
Add Associated Diagnoses If Applicable When Selecting Medical Necessity: Yes
Medical Necessity Information: It is in your best interest to select a reason for this procedure from the list below. All of these items fulfill various CMS LCD requirements except the new and changing color options.
Include Z78.9 (Other Specified Conditions Influencing Health Status) As An Associated Diagnosis?: No
Medical Necessity Clause: This procedure was medically necessary because the lesions that were treated were:
Detail Level: Detailed
Consent: - Consent was obtained, and risks were reviewed prior to procedure today.\\n- Risks discussed include but are not limited to bleeding, pigmentary change, infection, pain, recurrence, and scarring.\\n- Discussed appropriate wound care in detail.

## 2025-04-28 NOTE — PROCEDURE: COUNSELING
Detail Level: Simple
Patient Specific Counseling (Will Not Stick From Patient To Patient): Advised to use topical steroid as indicated for itch + use bandage to help further with itching.
Detail Level: Zone
Detail Level: Generalized
Patient Specific Counseling (Will Not Stick From Patient To Patient): - Advised that skin tags are benign growths.\\n- The most common methods for treatment are snip removal and cryosurgery.\\n- More will likely develop over time.\\n- Patient requested treatment today due to symptoms (See HPI).\\n- Recommended treatment with snip removal.\\n- Patient expressed understanding.